# Patient Record
Sex: FEMALE | Race: WHITE | NOT HISPANIC OR LATINO | Employment: OTHER | URBAN - NONMETROPOLITAN AREA
[De-identification: names, ages, dates, MRNs, and addresses within clinical notes are randomized per-mention and may not be internally consistent; named-entity substitution may affect disease eponyms.]

---

## 2017-10-03 ENCOUNTER — TRANSCRIBE ORDERS (OUTPATIENT)
Dept: ADMINISTRATIVE | Facility: HOSPITAL | Age: 72
End: 2017-10-03

## 2017-10-03 DIAGNOSIS — Z12.31 ENCOUNTER FOR SCREENING MAMMOGRAM FOR MALIGNANT NEOPLASM OF BREAST: Primary | ICD-10-CM

## 2017-10-09 ENCOUNTER — HOSPITAL ENCOUNTER (OUTPATIENT)
Dept: MAMMOGRAPHY | Facility: HOSPITAL | Age: 72
Discharge: HOME | End: 2017-10-09
Attending: INTERNAL MEDICINE | Admitting: INTERNAL MEDICINE

## 2017-10-09 DIAGNOSIS — Z12.31 ENCOUNTER FOR SCREENING MAMMOGRAM FOR MALIGNANT NEOPLASM OF BREAST: ICD-10-CM

## 2017-10-09 PROCEDURE — G0202 SCR MAMMO BI INCL CAD: HCPCS

## 2017-10-09 PROCEDURE — 77063 BREAST TOMOSYNTHESIS BI: CPT

## 2018-08-28 ENCOUNTER — PROCEDURE VISIT (OUTPATIENT)
Dept: OTOLARYNGOLOGY | Facility: CLINIC | Age: 73
End: 2018-08-28

## 2018-08-28 ENCOUNTER — OFFICE VISIT (OUTPATIENT)
Dept: OTOLARYNGOLOGY | Facility: CLINIC | Age: 73
End: 2018-08-28

## 2018-08-28 VITALS
WEIGHT: 166.25 LBS | BODY MASS INDEX: 26.72 KG/M2 | TEMPERATURE: 97.6 F | HEIGHT: 66 IN | DIASTOLIC BLOOD PRESSURE: 90 MMHG | HEART RATE: 87 BPM | SYSTOLIC BLOOD PRESSURE: 150 MMHG

## 2018-08-28 DIAGNOSIS — H90.3 SENSORINEURAL HEARING LOSS (SNHL) OF BOTH EARS: Primary | ICD-10-CM

## 2018-08-28 DIAGNOSIS — H90.3 SENSORINEURAL HEARING LOSS (SNHL), BILATERAL: Primary | ICD-10-CM

## 2018-08-28 PROCEDURE — 99213 OFFICE O/P EST LOW 20 MIN: CPT | Performed by: NURSE PRACTITIONER

## 2018-08-28 PROCEDURE — 92550 TYMPANOMETRY & REFLEX THRESH: CPT | Performed by: AUDIOLOGIST

## 2018-08-28 RX ORDER — ATORVASTATIN CALCIUM 40 MG/1
TABLET, FILM COATED ORAL
COMMUNITY

## 2018-08-28 RX ORDER — NICOTINE POLACRILEX 2 MG
GUM BUCCAL
COMMUNITY

## 2018-08-28 NOTE — PROGRESS NOTES
CASE HISTORY DETAILS   Ms. Robertson presented to the clinic this date with complaints of possible right TM perforation.        SUMMARY   RIGHT  · Otoscopy revealed clear EAC/Unremarkable TM.  · Mild to severe sloping sensorineural hearing loss.  · Immitance measures are consistent with normal middle ear function.    LEFT  · Otoscopy revealed clear EAC/Unremarkable TM.  · Mild to severe sloping sensorineural hearing loss.  · Immitance measures are consistent with normal middle ear function.    RECOMMENDATIONS   Results of today's evaluation were discussed with Ms. Robertson and the following recommendations were made:  1. ENT evaluation today as scheduled.  2. HAE as desired.  3. Monitor hearing yearly.    AUDIOGRAM AND IMMITANCE       Gayla Hernández, CCC-A  Audiologist

## 2018-08-28 NOTE — PATIENT INSTRUCTIONS
Call for problems or worsening symptoms    Avoid loud noise exposure. Protect hearing in with extreme loud noise.   Recommend fan, sound machine, white noise from TV for tinnitus masking. Avoid excessive caffeine, decrease stress level, monitor BP regularly, routine sleep schedule. Low Sodium Diet.   Tinnitus Handout   Hearing loss handout   Appt with hearing aid specialist for hearing aid fitting when patient so desires.   Call for change or worsening symptoms not controlled by current treatment regimen.

## 2018-08-28 NOTE — PROGRESS NOTES
YOB: 1945  Location: [facility]  Location Address: [address] [place] [address], [facility], [address] [place] [address]  Location Phone: [phone]    Chief Complaint   Patient presents with   • Hearing Loss       History of Present Illness  Rosina Robertson is a 72 y.o. female.  Rosina Robertson is here for evaluation of ENT complaints. The patient has had problems with cerumen accumulation and decreased hearing  The symptoms are not localized to a particular location. The patient has had moderate symptoms. The symptoms have been present for the last several months The symptoms are aggravated by  no identifiable factors. The symptoms are improved by no identifiable factors.  Recently developed an ear pain/dysfunction after having her ear flushed.  She routinely has her ears flushed.       Procedure visit     2018  [facility]   Vicki Jean Baptiste AUD   Audiology   Sensorineural hearing loss (SNHL), bilateral   Dx   Hearing Loss   Reason for Visit    Progress Notes           CASE HISTORY DETAILS   Ms. Robertson presented to the clinic this date with complaints of possible right TM perforation.         SUMMARY   RIGHT  ? Otoscopy revealed clear EAC/Unremarkable TM.  ? Mild to severe sloping sensorineural hearing loss.  ? Immitance measures are consistent with normal middle ear function.     LEFT  ? Otoscopy revealed clear EAC/Unremarkable TM.  ? Mild to severe sloping sensorineural hearing loss.  ? Immitance measures are consistent with normal middle ear function.     RECOMMENDATIONS   Results of today's evaluation were discussed with Ms. Robertson and the following recommendations were made:  1. ENT evaluation today as scheduled.  2. HAE as desired.  3. Monitor hearing yearly.     AUDIOGRAM AND IMMITANCE        Gayla Hernández, [facility]-A  Audiologist              Past Medical History:   Diagnosis Date   • Eczema    • High cholesterol        Past Surgical History:   Procedure  Laterality Date   • HYSTERECTOMY      partial   • TONSILLECTOMY         Outpatient Prescriptions Marked as Taking for the 8/28/18 encounter (Office Visit) with Azul Parks APRN   Medication Sig Dispense Refill   • atorvastatin (LIPITOR) 40 MG tablet atorvastatin 40 mg tablet   TAKE 1 TABLET BY MOUTH  DAILY     • Biotin 1 MG capsule biotin   bid     • Calcium Carbonate (CALTRATE 600 PO) Caltrate + D3 Plus Minerals   600mg/800IU 1 tablet twice daily     • Multiple Vitamins-Minerals (MULTIPLE VITAMINS/WOMENS PO) Multiple Vitamin, Womens   1 po qd         Patient has no known allergies.    Family History   Problem Relation Age of Onset   • No Known Problems Mother    • No Known Problems Father        Social History     Social History   • Marital status:      Spouse name: N/A   • Number of children: N/A   • Years of education: N/A     Occupational History   • Not on file.     Social History Main Topics   • Smoking status: Former Smoker   • Smokeless tobacco: Never Used      Comment: quit 2002   • Alcohol use Yes      Comment: occasionally   • Drug use: Unknown   • Sexual activity: Not on file     Other Topics Concern   • Not on file     Social History Narrative   • No narrative on file       Review of Systems   Constitutional: Negative.    HENT:        SEE HPI   Eyes: Negative.    Respiratory: Negative.    Cardiovascular: Negative.    Gastrointestinal: Negative.    Endocrine: Negative.    Genitourinary: Negative.    Musculoskeletal: Negative.    Skin: Negative.    Allergic/Immunologic: Negative.    Neurological: Negative.    Hematological: Negative.    Psychiatric/Behavioral: Negative.        Vitals:    08/28/18 1439   BP: 150/90   Pulse: 87   Temp: 97.6 °F (36.4 °C)       Body mass index is 26.83 kg/m².    Objective     Physical Exam  CONSTITUTIONAL: well nourished, alert, oriented, in no acute distress     COMMUNICATION AND VOICE: able to communicate normally, normal voice quality    HEAD: normocephalic, no  lesions, atraumatic, no tenderness, no masses     FACE: appearance normal, no lesions, no tenderness, no deformities, facial motion symmetric    SALIVARY GLANDS: parotid glands with no tenderness, no swelling, no masses, submandibular glands with normal size, nontender    EYES: ocular motility normal, eyelids normal, orbits normal, no proptosis, conjunctiva normal , pupils equal, round     EARS:  Hearing: response to conversational voice normal bilaterally   External Ears: auricles without lesions  Otoscopic: tympanic membrane appearance normal, no lesions, no perforation, normal mobility, no fluid    NOSE:  External Nose: structure normal, no tenderness on palpation, no nasal discharge, no lesions, no evidence of trauma, nostrils patent   Intranasal Exam: nasal mucosa normal, vestibule within normal limits, inferior turbinate normal, nasal septum midline     ORAL:  Lips: upper and lower lips without lesion   Teeth: dentition within normal limits for age   Gums: gingivae healthy   Oral Mucosa: oral mucosa normal, no mucosal lesions   Floor of Mouth: Warthin’s duct patent, mucosa normal  Tongue: lingual mucosa normal without lesions, normal tongue mobility   Palate: soft and hard palates with normal mucosa and structure  Oropharynx: oropharyngeal mucosa normal    NECK: neck appearance normal, no mass,  noted without erythema or tenderness    LYMPH NODES: no lymphadenopathy    CHEST/RESPIRATORY: respiratory effort normal    CARDIOVASCULAR:  extremities without cyanosis or edema      NEUROLOGIC/PSYCHIATRIC: oriented to time, place and person, mood normal, affect appropriate, CN II-XII intact grossly    Assessment/Plan   Rosina was seen today for hearing loss.    Diagnoses and all orders for this visit:    Sensorineural hearing loss (SNHL) of both ears      * Surgery not found *  No orders of the defined types were placed in this encounter.    Return in about 6 months (around 2/28/2019).       Patient Instructions   Call  for problems or worsening symptoms    Avoid loud noise exposure. Protect hearing in with extreme loud noise.   Recommend fan, sound machine, white noise from TV for tinnitus masking. Avoid excessive caffeine, decrease stress level, monitor BP regularly, routine sleep schedule. Low Sodium Diet.   Tinnitus Handout   Hearing loss handout   Appt with hearing aid specialist for hearing aid fitting when patient so desires.   Call for change or worsening symptoms not controlled by current treatment regimen.

## 2018-10-01 ENCOUNTER — TRANSCRIBE ORDERS (OUTPATIENT)
Dept: ADMINISTRATIVE | Facility: HOSPITAL | Age: 73
End: 2018-10-01

## 2018-10-01 DIAGNOSIS — Z12.31 ENCOUNTER FOR SCREENING MAMMOGRAM FOR MALIGNANT NEOPLASM OF BREAST: ICD-10-CM

## 2018-10-01 DIAGNOSIS — Z78.0 ASYMPTOMATIC MENOPAUSAL STATE: Primary | ICD-10-CM

## 2018-10-11 ENCOUNTER — HOSPITAL ENCOUNTER (OUTPATIENT)
Dept: BONE DENSITY | Facility: HOSPITAL | Age: 73
Discharge: HOME | End: 2018-10-11

## 2018-10-11 ENCOUNTER — HOSPITAL ENCOUNTER (OUTPATIENT)
Dept: MAMMOGRAPHY | Facility: HOSPITAL | Age: 73
Discharge: HOME | End: 2018-10-11
Admitting: PHYSICIAN ASSISTANT

## 2018-10-11 DIAGNOSIS — Z12.31 ENCOUNTER FOR SCREENING MAMMOGRAM FOR MALIGNANT NEOPLASM OF BREAST: ICD-10-CM

## 2018-10-11 DIAGNOSIS — Z78.0 ASYMPTOMATIC MENOPAUSAL STATE: ICD-10-CM

## 2018-10-11 PROCEDURE — 77063 BREAST TOMOSYNTHESIS BI: CPT

## 2018-10-11 PROCEDURE — 77067 SCR MAMMO BI INCL CAD: CPT

## 2018-10-11 PROCEDURE — 77080 DXA BONE DENSITY AXIAL: CPT

## 2019-02-28 ENCOUNTER — OFFICE VISIT (OUTPATIENT)
Dept: OTOLARYNGOLOGY | Facility: CLINIC | Age: 74
End: 2019-02-28

## 2019-02-28 VITALS
HEART RATE: 80 BPM | BODY MASS INDEX: 26.26 KG/M2 | DIASTOLIC BLOOD PRESSURE: 84 MMHG | SYSTOLIC BLOOD PRESSURE: 144 MMHG | WEIGHT: 163.38 LBS | TEMPERATURE: 97.8 F | HEIGHT: 66 IN

## 2019-02-28 DIAGNOSIS — H61.23 BILATERAL IMPACTED CERUMEN: Primary | ICD-10-CM

## 2019-02-28 PROCEDURE — 69210 REMOVE IMPACTED EAR WAX UNI: CPT | Performed by: NURSE PRACTITIONER

## 2019-02-28 RX ORDER — CHLORAL HYDRATE 500 MG
CAPSULE ORAL
COMMUNITY

## 2019-07-31 ENCOUNTER — TRANSCRIBE ORDERS (OUTPATIENT)
Dept: ADMINISTRATIVE | Facility: HOSPITAL | Age: 74
End: 2019-07-31

## 2019-07-31 DIAGNOSIS — Z12.31 ENCOUNTER FOR SCREENING MAMMOGRAM FOR MALIGNANT NEOPLASM OF BREAST: Primary | ICD-10-CM

## 2019-08-30 ENCOUNTER — OFFICE VISIT (OUTPATIENT)
Dept: OTOLARYNGOLOGY | Facility: CLINIC | Age: 74
End: 2019-08-30

## 2019-08-30 VITALS
TEMPERATURE: 98.7 F | WEIGHT: 156 LBS | BODY MASS INDEX: 25.07 KG/M2 | SYSTOLIC BLOOD PRESSURE: 128 MMHG | RESPIRATION RATE: 17 BRPM | OXYGEN SATURATION: 100 % | HEIGHT: 66 IN | DIASTOLIC BLOOD PRESSURE: 72 MMHG | HEART RATE: 75 BPM

## 2019-08-30 DIAGNOSIS — H90.3 SENSORINEURAL HEARING LOSS (SNHL) OF BOTH EARS: Primary | ICD-10-CM

## 2019-08-30 PROCEDURE — 99213 OFFICE O/P EST LOW 20 MIN: CPT | Performed by: NURSE PRACTITIONER

## 2019-10-15 ENCOUNTER — HOSPITAL ENCOUNTER (OUTPATIENT)
Dept: MAMMOGRAPHY | Facility: HOSPITAL | Age: 74
Discharge: HOME | End: 2019-10-15
Admitting: INTERNAL MEDICINE

## 2019-10-15 DIAGNOSIS — Z12.31 ENCOUNTER FOR SCREENING MAMMOGRAM FOR MALIGNANT NEOPLASM OF BREAST: ICD-10-CM

## 2019-10-15 PROCEDURE — 77063 BREAST TOMOSYNTHESIS BI: CPT

## 2019-10-15 PROCEDURE — 77067 SCR MAMMO BI INCL CAD: CPT

## 2020-02-28 ENCOUNTER — OFFICE VISIT (OUTPATIENT)
Dept: OTOLARYNGOLOGY | Facility: CLINIC | Age: 75
End: 2020-02-28

## 2020-02-28 VITALS
HEART RATE: 83 BPM | BODY MASS INDEX: 25.04 KG/M2 | WEIGHT: 155.8 LBS | HEIGHT: 66 IN | TEMPERATURE: 97.4 F | SYSTOLIC BLOOD PRESSURE: 150 MMHG | DIASTOLIC BLOOD PRESSURE: 81 MMHG

## 2020-02-28 DIAGNOSIS — H90.3 SENSORINEURAL HEARING LOSS (SNHL) OF BOTH EARS: Primary | ICD-10-CM

## 2020-02-28 PROCEDURE — 99213 OFFICE O/P EST LOW 20 MIN: CPT | Performed by: NURSE PRACTITIONER

## 2020-03-22 NOTE — PROGRESS NOTES
PRIMARY CARE PROVIDER: David Rolle MD  REFERRING PROVIDER: No ref. provider found    Chief Complaint   Patient presents with   • Follow-up       Subjective   History of Present Illness:  Rosina Robertson is a 74 y.o. female who is here for follow up. She has had problems with cerumen accumulation and decreased hearing. The symptoms are localized to both ears. The symptoms severity was described as: mild to moderate and stable. The symptoms have been: present for the last several years. There have been no identified factors that aggravate the symptoms. The symptoms are improved by routine ear cleaning.  She denies otalgia, otorrhea, dizziness, vertigo, or change in hearing.      Review of Systems:  Review of Systems   Constitutional: Negative for chills and fever.   HENT: Positive for hearing loss. Negative for ear discharge, ear pain, sinus pressure, sinus pain, trouble swallowing and voice change.    Respiratory: Negative for cough and shortness of breath.    Cardiovascular: Negative for chest pain.   Gastrointestinal: Negative for diarrhea, nausea and vomiting.         Past History:  Past Medical History:   Diagnosis Date   • Eczema    • High cholesterol    • Sensorineural hearing loss      Past Surgical History:   Procedure Laterality Date   • HYSTERECTOMY      partial   • TONSILLECTOMY       Family History   Problem Relation Age of Onset   • No Known Problems Mother    • No Known Problems Father    • Breast cancer Neg Hx      Social History     Tobacco Use   • Smoking status: Former Smoker     Packs/day: 1.00     Years: 30.00     Pack years: 30.00     Types: Cigarettes     Last attempt to quit: 2005     Years since quitting: 15.2   • Smokeless tobacco: Never Used   Substance Use Topics   • Alcohol use: Yes     Comment: occasionally   • Drug use: Never     Allergies:  Patient has no known allergies.    Current Outpatient Medications:   •  atorvastatin (LIPITOR) 40 MG tablet, atorvastatin 40 mg tablet   "TAKE 1 TABLET BY MOUTH  DAILY, Disp: , Rfl:   •  Biotin 1 MG capsule, biotin  bid, Disp: , Rfl:   •  Calcium Carbonate (CALTRATE 600) 1500 (600 Ca) MG tablet, Caltrate + D3 Plus Minerals  600mg/400IU 1 tablet twice daily, Disp: , Rfl:   •  Cholecalciferol (VITAMIN D HIGH POTENCY PO), Take  by mouth., Disp: , Rfl:   •  Multiple Vitamins-Minerals (MULTIPLE VITAMINS/WOMENS PO), Multiple Vitamin, Womens  1 po qd, Disp: , Rfl:   •  Omega-3 Fatty Acids (FISH OIL) 1000 MG capsule capsule, Take  by mouth Daily With Breakfast., Disp: , Rfl:       Objective     Vital Signs:    /81   Pulse 83   Temp 97.4 °F (36.3 °C) (Temporal)   Ht 167.6 cm (66\")   Wt 70.7 kg (155 lb 12.8 oz)   BMI 25.15 kg/m²     Physical Exam:  Physical Exam  CONSTITUTIONAL: well nourished, well-developed, alert, oriented, in no acute distress   COMMUNICATION AND VOICE: able to communicate normally, normal voice quality  HEAD: normocephalic, no lesions, atraumatic, no tenderness, no masses   FACE: appearance normal, no lesions, no tenderness, no deformities, facial motion symmetric  SALIVARY GLANDS: parotid glands with no tenderness, no swelling, no masses, submandibular glands with normal size, nontender  EYES: ocular motility normal, eyelids normal, orbits normal, no proptosis, conjunctiva normal , pupils equal, round   EARS:  Hearing: response to conversational voice normal bilaterally   External Ears: auricles without lesions  Otoscopic: tympanic membrane appearance normal, no lesions, no perforation, normal mobility, no fluid, no cerumen present in the bilateral external auditory canal  NOSE:  External Nose: structure normal, no tenderness on palpation, no nasal discharge, no lesions, no evidence of trauma, nostrils patent   ORAL:  Lips: upper and lower lips without lesion   NECK: neck appearance normal, no masses or tenderness  LYMPH NODES: no lymphadenopathy  CHEST/RESPIRATORY: respiratory effort normal, normal breath sounds "   CARDIOVASCULAR: rate and rhythm normal, extremities without cyanosis or edema    NEUROLOGIC/PSYCHIATRIC: oriented to time, place and person, mood normal, affect appropriate, CN II-XII intact grossly      Assessment   Assessment:  1. Sensorineural hearing loss (SNHL) of both ears        Plan   Plan:    Use hearing protection in loud noise situations.  Consider hearing aid amplification.  Obtain a yearly audiogram to follow hearing.  Call for ear drainage, ear pain, fever over 101, or hearing loss. Call for problems or worsening symptoms.       Return in about 1 year (around 2/28/2021) for Recheck, With Audio.    My findings and recommendations were discussed and questions were answered.     Margie Gloria, APRN

## 2020-08-06 ENCOUNTER — TRANSCRIBE ORDERS (OUTPATIENT)
Dept: ADMINISTRATIVE | Facility: HOSPITAL | Age: 75
End: 2020-08-06

## 2020-08-06 DIAGNOSIS — Z78.0 ASYMPTOMATIC MENOPAUSAL STATE: Primary | ICD-10-CM

## 2020-08-06 DIAGNOSIS — Z12.31 ENCOUNTER FOR SCREENING MAMMOGRAM FOR MALIGNANT NEOPLASM OF BREAST: ICD-10-CM

## 2020-10-19 ENCOUNTER — HOSPITAL ENCOUNTER (OUTPATIENT)
Dept: MAMMOGRAPHY | Facility: HOSPITAL | Age: 75
Discharge: HOME | End: 2020-10-19

## 2020-10-19 ENCOUNTER — HOSPITAL ENCOUNTER (OUTPATIENT)
Dept: BONE DENSITY | Facility: HOSPITAL | Age: 75
Discharge: HOME | End: 2020-10-19

## 2020-10-19 DIAGNOSIS — Z78.0 ASYMPTOMATIC MENOPAUSAL STATE: ICD-10-CM

## 2020-10-19 DIAGNOSIS — Z12.31 ENCOUNTER FOR SCREENING MAMMOGRAM FOR MALIGNANT NEOPLASM OF BREAST: ICD-10-CM

## 2020-10-19 PROCEDURE — 77067 SCR MAMMO BI INCL CAD: CPT

## 2020-10-19 PROCEDURE — 77080 DXA BONE DENSITY AXIAL: CPT

## 2020-10-19 PROCEDURE — 77063 BREAST TOMOSYNTHESIS BI: CPT

## 2021-02-05 ENCOUNTER — IMMUNIZATION (OUTPATIENT)
Age: 76
End: 2021-02-05
Payer: MEDICARE

## 2021-02-05 PROCEDURE — 0001A COVID-19, PFIZER VACCINE 30MCG/0.3ML DOSE: CPT | Performed by: FAMILY MEDICINE

## 2021-02-05 PROCEDURE — 91300 COVID-19, PFIZER VACCINE 30MCG/0.3ML DOSE: CPT | Performed by: FAMILY MEDICINE

## 2021-02-26 ENCOUNTER — IMMUNIZATION (OUTPATIENT)
Age: 76
End: 2021-02-26
Payer: MEDICARE

## 2021-02-26 PROCEDURE — 0002A PR IMM ADMN SARSCOV2 30MCG/0.3ML DIL RECON 2ND DOSE: CPT | Performed by: FAMILY MEDICINE

## 2021-02-26 PROCEDURE — 91300 COVID-19, PFIZER VACCINE 30MCG/0.3ML DOSE: CPT | Performed by: FAMILY MEDICINE

## 2021-03-01 ENCOUNTER — OFFICE VISIT (OUTPATIENT)
Dept: OTOLARYNGOLOGY | Facility: CLINIC | Age: 76
End: 2021-03-01

## 2021-03-01 ENCOUNTER — PROCEDURE VISIT (OUTPATIENT)
Dept: OTOLARYNGOLOGY | Facility: CLINIC | Age: 76
End: 2021-03-01

## 2021-03-01 VITALS — DIASTOLIC BLOOD PRESSURE: 74 MMHG | HEART RATE: 77 BPM | SYSTOLIC BLOOD PRESSURE: 137 MMHG | TEMPERATURE: 97.4 F

## 2021-03-01 DIAGNOSIS — H90.3 SENSORINEURAL HEARING LOSS (SNHL) OF BOTH EARS: Primary | ICD-10-CM

## 2021-03-01 DIAGNOSIS — H61.23 BILATERAL IMPACTED CERUMEN: ICD-10-CM

## 2021-03-01 PROCEDURE — 92557 COMPREHENSIVE HEARING TEST: CPT | Performed by: NURSE PRACTITIONER

## 2021-03-01 PROCEDURE — 92567 TYMPANOMETRY: CPT | Performed by: NURSE PRACTITIONER

## 2021-03-01 PROCEDURE — 69210 REMOVE IMPACTED EAR WAX UNI: CPT | Performed by: NURSE PRACTITIONER

## 2021-03-01 PROCEDURE — 99213 OFFICE O/P EST LOW 20 MIN: CPT | Performed by: NURSE PRACTITIONER

## 2021-03-01 NOTE — PROGRESS NOTES
PRIMARY CARE PROVIDER: David Rolle MD  REFERRING PROVIDER: No ref. provider found    Chief Complaint   Patient presents with   • Follow-up     1 year follow up       Subjective   History of Present Illness:  Rosina Robertson is a 75 y.o. female who is here for follow up. She has had problems with cerumen accumulation and decreased hearing. The symptoms are localized to both ears. The symptoms severity was described as: mild to moderate and worsening. The symptoms have been: present for the last several years. There have been no identified factors that aggravate the symptoms. The symptoms are improved by routine ear cleaning.  She denies otalgia, otorrhea, dizziness, vertigo.      Review of Systems:  Review of Systems   Constitutional: Negative for chills and fever.   HENT: Positive for hearing loss. Negative for ear discharge, ear pain, sinus pressure, sinus pain, trouble swallowing and voice change.    Respiratory: Negative for cough and shortness of breath.    Cardiovascular: Negative for chest pain.   Gastrointestinal: Negative for diarrhea, nausea and vomiting.       Past History:  Past Medical History:   Diagnosis Date   • Eczema    • High cholesterol    • Sensorineural hearing loss      Past Surgical History:   Procedure Laterality Date   • HYSTERECTOMY      partial   • TONSILLECTOMY       Family History   Problem Relation Age of Onset   • No Known Problems Mother    • No Known Problems Father    • Breast cancer Neg Hx      Social History     Tobacco Use   • Smoking status: Former Smoker     Packs/day: 1.00     Years: 30.00     Pack years: 30.00     Types: Cigarettes     Quit date:      Years since quittin.1   • Smokeless tobacco: Never Used   Substance Use Topics   • Alcohol use: Yes     Comment: occasionally   • Drug use: Never     Allergies:  Patient has no known allergies.    Current Outpatient Medications:   •  atorvastatin (LIPITOR) 40 MG tablet, atorvastatin 40 mg tablet  TAKE 1  TABLET BY MOUTH  DAILY, Disp: , Rfl:   •  Biotin 1 MG capsule, biotin  bid, Disp: , Rfl:   •  Calcium Carbonate (CALTRATE 600) 1500 (600 Ca) MG tablet, Caltrate + D3 Plus Minerals  600mg/400IU 1 tablet twice daily, Disp: , Rfl:   •  Cholecalciferol (VITAMIN D HIGH POTENCY PO), Take  by mouth., Disp: , Rfl:   •  Multiple Vitamins-Minerals (MULTIPLE VITAMINS/WOMENS PO), Multiple Vitamin, Womens  1 po qd, Disp: , Rfl:   •  Omega-3 Fatty Acids (FISH OIL) 1000 MG capsule capsule, Take  by mouth Daily With Breakfast., Disp: , Rfl:       Objective     Vital Signs:  Temp:  [97.4 °F (36.3 °C)] 97.4 °F (36.3 °C)  Heart Rate:  [77] 77  BP: (137)/(74) 137/74 /74   Pulse 77   Temp 97.4 °F (36.3 °C) (Temporal)     Physical Exam:  Physical Exam  CONSTITUTIONAL: well nourished, well-developed, alert, oriented, in no acute distress   COMMUNICATION AND VOICE: able to communicate normally, normal voice quality  HEAD: normocephalic, no lesions, atraumatic, no tenderness, no masses   FACE: appearance normal, no lesions, no tenderness, no deformities, facial motion symmetric  SALIVARY GLANDS: parotid glands with no tenderness, no swelling, no masses, submandibular glands with normal size, nontender  EYES: ocular motility normal, eyelids normal, orbits normal, no proptosis, conjunctiva normal , pupils equal, round   EARS:  Hearing: response to conversational voice normal   External Ears: auricles without lesions  Otoscopic: tympanic membrane appearance normal, no lesions, no perforation, normal mobility, no fluid, bilateral cerumen impactions removed under microscopy with suction  NOSE:  External Nose: structure normal, no tenderness on palpation, no nasal discharge, no lesions, no evidence of trauma, nostrils patent   ORAL:  Lips: upper and lower lips without lesion   NECK: neck appearance normal, no masses or tenderness  LYMPH NODES: no lymphadenopathy  CHEST/RESPIRATORY: respiratory effort normal, normal breath sounds    CARDIOVASCULAR: rate and rhythm normal, extremities without cyanosis or edema    NEUROLOGIC/PSYCHIATRIC: oriented to time, place and person, mood normal, affect appropriate, CN II-XII intact grossly      PROCEDURE NOTE    LOCATION: [facility]  PROVIDER: LUCIO George   PREOPERATIVE DIAGNOSIS: Cerumen Impaction bilaterally   POSTOPERATIVE DIAGNOSIS: Same  PROCEDURE: Cerumen removal  ANESTHESIA: None   REASON FOR THE OPERATION: The patient verbally consented to intervention after a full discussion of risks, benefits, and alternatives. No guarantees were made or implied.   DETAILS OF THE OPERATION: The patient was reclined in the procedure room chair. The binocular microscope was used to visualize the ear canal and tympanic membrane. Cerumen was then removed from the both ears using a suction. Findings: Bilateral EACs clear without lesion.  Bilateral tympanic membranes intact without effusion. Patient tolerated procedure well and was without complications      Results reviewed:        Assessment   Assessment:  1. Sensorineural hearing loss (SNHL) of both ears    2. Bilateral impacted cerumen        Plan   Plan:    Use hearing protection in loud noise situations.  Consider hearing aid amplification.  Obtain a yearly audiogram to follow hearing.  Call for ear drainage, ear pain, fever over 101, or hearing loss. Call for problems or worsening symptoms.     Time Spent: I spent 21 minutes caring for Rosina on this date of service. This time includes time spent by me in the following activities: preparing for the visit, reviewing tests, obtaining and/or reviewing a separately obtained history, performing a medically appropriate examination and/or evaluation, counseling and educating the patient/family/caregiver, documenting information in the medical record and independently interpreting results and communicating that information with the patient/family/caregiver.     Return in about 1 year (around 3/1/2022) for  Recheck.    My findings and recommendations were discussed and questions were answered.     Margie Gloria, APRN

## 2021-08-09 ENCOUNTER — TRANSCRIBE ORDERS (OUTPATIENT)
Dept: ADMINISTRATIVE | Facility: HOSPITAL | Age: 76
End: 2021-08-09

## 2021-08-09 DIAGNOSIS — Z12.31 ENCOUNTER FOR SCREENING MAMMOGRAM FOR MALIGNANT NEOPLASM OF BREAST: Primary | ICD-10-CM

## 2021-10-20 ENCOUNTER — HOSPITAL ENCOUNTER (OUTPATIENT)
Dept: MAMMOGRAPHY | Facility: HOSPITAL | Age: 76
Discharge: HOME | End: 2021-10-20
Admitting: INTERNAL MEDICINE

## 2021-10-20 DIAGNOSIS — Z12.31 ENCOUNTER FOR SCREENING MAMMOGRAM FOR MALIGNANT NEOPLASM OF BREAST: ICD-10-CM

## 2021-10-20 PROCEDURE — 77063 BREAST TOMOSYNTHESIS BI: CPT

## 2021-10-20 PROCEDURE — 77067 SCR MAMMO BI INCL CAD: CPT

## 2021-12-08 ENCOUNTER — OFFICE VISIT (OUTPATIENT)
Dept: OTOLARYNGOLOGY | Facility: CLINIC | Age: 76
End: 2021-12-08

## 2021-12-08 VITALS — HEART RATE: 86 BPM | TEMPERATURE: 97.2 F | DIASTOLIC BLOOD PRESSURE: 79 MMHG | SYSTOLIC BLOOD PRESSURE: 121 MMHG

## 2021-12-08 DIAGNOSIS — J31.0 CHRONIC RHINITIS: ICD-10-CM

## 2021-12-08 DIAGNOSIS — Z86.69 HISTORY OF IMPACTED CERUMEN: ICD-10-CM

## 2021-12-08 DIAGNOSIS — H90.3 SENSORINEURAL HEARING LOSS (SNHL) OF BOTH EARS: Primary | ICD-10-CM

## 2021-12-08 PROCEDURE — 99214 OFFICE O/P EST MOD 30 MIN: CPT | Performed by: NURSE PRACTITIONER

## 2021-12-08 RX ORDER — FLUTICASONE PROPIONATE 50 MCG
2 SPRAY, SUSPENSION (ML) NASAL DAILY
Qty: 16 G | Refills: 11 | Status: SHIPPED | OUTPATIENT
Start: 2021-12-08 | End: 2022-01-07

## 2021-12-08 NOTE — PROGRESS NOTES
PRIMARY CARE PROVIDER: David Rolle MD  REFERRING PROVIDER: No ref. provider found    Chief Complaint   Patient presents with   • Follow-up       Subjective   History of Present Illness:  Rosina Robertson is a 76 y.o. female who is here for follow up. She has had problems with cerumen accumulation and decreased hearing. The symptoms are localized to both ears. The symptoms severity was described as: mild to moderate and worsening. The symptoms have been: present for the last several years. There have been no identified factors that aggravate the symptoms. The symptoms are improved by routine ear cleaning.  She also reports a recent flareup of right sided otalgia and ear pressure.  She describes this as aching.  It was intermittent and began over 1 week ago.  It has since resolved on its own.  She also complains of allergy symptoms including nasal congestion and nasal drainage.  Today, she denies otalgia, otorrhea, dizziness, vertigo.      Past History:  Past Medical History:   Diagnosis Date   • Eczema    • High cholesterol    • Sensorineural hearing loss      Past Surgical History:   Procedure Laterality Date   • HYSTERECTOMY      partial   • TONSILLECTOMY       Family History   Problem Relation Age of Onset   • No Known Problems Mother    • No Known Problems Father    • Breast cancer Neg Hx      Social History     Tobacco Use   • Smoking status: Former Smoker     Packs/day: 1.00     Years: 30.00     Pack years: 30.00     Types: Cigarettes     Quit date:      Years since quittin.9   • Smokeless tobacco: Never Used   Substance Use Topics   • Alcohol use: Yes     Comment: occasionally   • Drug use: Never     Allergies:  Patient has no known allergies.    Current Outpatient Medications:   •  atorvastatin (LIPITOR) 40 MG tablet, atorvastatin 40 mg tablet  TAKE 1 TABLET BY MOUTH  DAILY, Disp: , Rfl:   •  Biotin 1 MG capsule, biotin  bid, Disp: , Rfl:   •  Calcium Carbonate (CALTRATE 600) 1500 (600 Ca)  MG tablet, Caltrate + D3 Plus Minerals  600mg/400IU 1 tablet twice daily, Disp: , Rfl:   •  Cholecalciferol (VITAMIN D HIGH POTENCY PO), Take  by mouth., Disp: , Rfl:   •  Multiple Vitamins-Minerals (MULTIPLE VITAMINS/WOMENS PO), Multiple Vitamin, Womens  1 po qd, Disp: , Rfl:   •  Omega-3 Fatty Acids (FISH OIL) 1000 MG capsule capsule, Take  by mouth Daily With Breakfast., Disp: , Rfl:   •  fluticasone (FLONASE) 50 MCG/ACT nasal spray, 2 sprays into the nostril(s) as directed by provider Daily for 30 days., Disp: 16 g, Rfl: 11      Objective     Vital Signs:    /79   Pulse 86   Temp 97.2 °F (36.2 °C) (Temporal)     Physical Exam:  Physical Exam  CONSTITUTIONAL: well nourished, well-developed, alert, oriented, in no acute distress   COMMUNICATION AND VOICE: able to communicate normally, normal voice quality  EARS:  Hearing: response to conversational voice normal   External Ears: auricles without lesions  Otoscopic: tympanic membrane appearance normal, no lesions, no perforation, normal mobility, no fluid  NOSE:  External Nose: structure normal, no tenderness on palpation, no nasal discharge, no lesions, no evidence of trauma, nostrils patent   ORAL:  Lips: upper and lower lips without lesion   NEUROLOGIC/PSYCHIATRIC: oriented to time, place and person, mood normal, affect appropriate, CN II-XII intact grossly      Results reviewed:        Assessment   Assessment:  1. Sensorineural hearing loss (SNHL) of both ears    2. History of impacted cerumen    3. Chronic rhinitis        Plan   Plan:  The otalgia has resolved on its own.    There is no cerumen present.  Use hearing protection in loud noise situations.  Consider hearing aid amplification.  Obtain a yearly audiogram to follow hearing.  Start Flonase.  She was instructed to call or return should any problems arise prior to next office visit.    New Medications Ordered This Visit   Medications   • fluticasone (FLONASE) 50 MCG/ACT nasal spray     Si  sprays into the nostril(s) as directed by provider Daily for 30 days.     Dispense:  16 g     Refill:  11       Return if symptoms worsen or fail to improve, for Next scheduled follow up.    My findings and recommendations were discussed and questions were answered.     Margie Gloria, APRN

## 2022-01-06 ENCOUNTER — TELEPHONE (OUTPATIENT)
Dept: OTOLARYNGOLOGY | Facility: CLINIC | Age: 77
End: 2022-01-06

## 2022-01-06 NOTE — TELEPHONE ENCOUNTER
Caller: Rosina Robertson    Relationship: Self    Best call back number: [phone]    What form or medical record are you requesting: REFERRAL    Who is requesting this form or medical record from you: DR CHADWICK STOCK    How would you like to receive the form or medical records (pick-up, mail, fax):   FAX NUMBER NOT PROVIDED BY PT      Timeframe paperwork needed: PRIOR TO APPT 1/19/22    Additional notes: PT CALLED TO REQUEST REFERRAL BE SENT TO AUDIOLOGY OFFICE OF DR CHADWICK STOCK BEFORE HER APPT ON 1/19/22. SHE DID NOT HAVE A FAX NUMBER FOR THEIR OFFICE. SHE SPOKE WITH VASHTI @ [phone]    IF NEEDED, PT CAN BE REACHED AT ANYTIME; OKAY TO LEAVE MESSAGE IF NO ANSWER.

## 2022-03-02 ENCOUNTER — OFFICE VISIT (OUTPATIENT)
Dept: OTOLARYNGOLOGY | Facility: CLINIC | Age: 77
End: 2022-03-02

## 2022-03-02 VITALS — DIASTOLIC BLOOD PRESSURE: 87 MMHG | SYSTOLIC BLOOD PRESSURE: 150 MMHG | HEART RATE: 76 BPM | TEMPERATURE: 97.6 F

## 2022-03-02 DIAGNOSIS — H90.3 SENSORINEURAL HEARING LOSS (SNHL) OF BOTH EARS: Primary | ICD-10-CM

## 2022-03-02 DIAGNOSIS — Z86.69 HISTORY OF IMPACTED CERUMEN: ICD-10-CM

## 2022-03-02 PROCEDURE — 99213 OFFICE O/P EST LOW 20 MIN: CPT | Performed by: NURSE PRACTITIONER

## 2022-03-02 NOTE — PROGRESS NOTES
PRIMARY CARE PROVIDER: David Rolle MD  REFERRING PROVIDER: No ref. provider found    Chief Complaint   Patient presents with   • Follow-up     1 year follow up       Subjective   History of Present Illness:  Rosina Robertson is a 76 y.o. female who is here for follow up. She has had problems with cerumen accumulation and decreased hearing. The symptoms are localized to both ears. The symptoms severity was described as: mild to moderate and stable. The symptoms have been: present for the last several years. There have been no identified factors that aggravate the symptoms. The symptoms are improved by routine ear cleaning and hearing aids.  She reports she recently purchased hearing aids from Dr. Harrison. Today, she denies otalgia, otorrhea, dizziness, vertigo, or recent change in hearing.      Past History:  Past Medical History:   Diagnosis Date   • Eczema    • High cholesterol    • Sensorineural hearing loss      Past Surgical History:   Procedure Laterality Date   • HYSTERECTOMY      partial   • TONSILLECTOMY       Family History   Problem Relation Age of Onset   • No Known Problems Mother    • No Known Problems Father    • Breast cancer Neg Hx      Social History     Tobacco Use   • Smoking status: Former Smoker     Packs/day: 1.00     Years: 30.00     Pack years: 30.00     Types: Cigarettes     Quit date:      Years since quittin.1   • Smokeless tobacco: Never Used   Substance Use Topics   • Alcohol use: Yes     Comment: occasionally   • Drug use: Never     Allergies:  Patient has no known allergies.    Current Outpatient Medications:   •  atorvastatin (LIPITOR) 40 MG tablet, atorvastatin 40 mg tablet  TAKE 1 TABLET BY MOUTH  DAILY, Disp: , Rfl:   •  Biotin 1 MG capsule, biotin  bid, Disp: , Rfl:   •  Calcium Carbonate (CALTRATE 600) 1500 (600 Ca) MG tablet, Caltrate + D3 Plus Minerals  600mg/400IU 1 tablet twice daily, Disp: , Rfl:   •  Cholecalciferol (VITAMIN D HIGH POTENCY PO), Take  by  mouth., Disp: , Rfl:   •  Multiple Vitamins-Minerals (MULTIPLE VITAMINS/WOMENS PO), Multiple Vitamin, Womens  1 po qd, Disp: , Rfl:   •  Omega-3 Fatty Acids (FISH OIL) 1000 MG capsule capsule, Take  by mouth Daily With Breakfast., Disp: , Rfl:   •  fluticasone (FLONASE) 50 MCG/ACT nasal spray, 2 sprays into the nostril(s) as directed by provider Daily for 30 days., Disp: 16 g, Rfl: 11      Objective     Vital Signs:  Temp:  [97.6 °F (36.4 °C)] 97.6 °F (36.4 °C)  Heart Rate:  [76] 76  BP: (150)/(87) 150/87 /87   Pulse 76   Temp 97.6 °F (36.4 °C) (Temporal)     Physical Exam:  Physical Exam  CONSTITUTIONAL: well nourished, well-developed, alert, oriented, in no acute distress   COMMUNICATION AND VOICE: able to communicate normally, normal voice quality  EARS:  Hearing: response to conversational voice normal   External Ears: auricles without lesions  Otoscopic: tympanic membrane appearance normal, no lesions, no perforation, normal mobility, no fluid, no cerumen accumulation  NOSE:  External Nose: structure normal, no tenderness on palpation, no nasal discharge, no lesions, no evidence of trauma, nostrils patent   ORAL:  Lips: upper and lower lips without lesion   NEUROLOGIC/PSYCHIATRIC: oriented to time, place and person, mood normal, affect appropriate, CN II-XII intact grossly      Results reviewed:        Assessment   Assessment:  1. Sensorineural hearing loss (SNHL) of both ears    2. History of impacted cerumen        Plan   Plan:    There is no cerumen present.  Use hearing protection in loud noise situations.  Continue hearing aid amplification.  Obtain a yearly audiogram to follow hearing.  She was instructed to call or return should any problems arise prior to next office visit.    Time Spent: I spent 20 minutes caring for Rosina on this date of service. This time includes time spent by me in the following activities: preparing for the visit, reviewing tests, obtaining and/or reviewing a separately  obtained history, performing a medically appropriate examination and/or evaluation, counseling and educating the patient/family/caregiver and documenting information in the medical record.     No orders of the defined types were placed in this encounter.      Return in about 1 year (around 3/2/2023), or if symptoms worsen or fail to improve, for Recheck.    My findings and recommendations were discussed and questions were answered.     Margie Gloria, APRN

## 2022-09-22 ENCOUNTER — TRANSCRIBE ORDERS (OUTPATIENT)
Dept: ADMINISTRATIVE | Facility: HOSPITAL | Age: 77
End: 2022-09-22

## 2022-09-22 DIAGNOSIS — Z78.0 POSTMENOPAUSAL STATUS: ICD-10-CM

## 2022-09-22 DIAGNOSIS — Z12.31 ENCOUNTER FOR SCREENING MAMMOGRAM FOR MALIGNANT NEOPLASM OF BREAST: Primary | ICD-10-CM

## 2022-10-25 ENCOUNTER — HOSPITAL ENCOUNTER (OUTPATIENT)
Dept: MAMMOGRAPHY | Facility: HOSPITAL | Age: 77
Discharge: HOME | End: 2022-10-25

## 2022-10-25 ENCOUNTER — HOSPITAL ENCOUNTER (OUTPATIENT)
Dept: BONE DENSITY | Facility: HOSPITAL | Age: 77
Discharge: HOME | End: 2022-10-25

## 2022-10-25 DIAGNOSIS — Z78.0 POSTMENOPAUSAL STATUS: ICD-10-CM

## 2022-10-25 DIAGNOSIS — Z12.31 ENCOUNTER FOR SCREENING MAMMOGRAM FOR MALIGNANT NEOPLASM OF BREAST: ICD-10-CM

## 2022-10-25 PROCEDURE — 77063 BREAST TOMOSYNTHESIS BI: CPT

## 2022-10-25 PROCEDURE — 77080 DXA BONE DENSITY AXIAL: CPT

## 2022-10-25 PROCEDURE — 77067 SCR MAMMO BI INCL CAD: CPT

## 2023-01-18 ENCOUNTER — OFFICE VISIT (OUTPATIENT)
Dept: OTOLARYNGOLOGY | Facility: CLINIC | Age: 78
End: 2023-01-18
Payer: MEDICARE

## 2023-01-18 VITALS — TEMPERATURE: 97.2 F | HEART RATE: 68 BPM | SYSTOLIC BLOOD PRESSURE: 160 MMHG | DIASTOLIC BLOOD PRESSURE: 85 MMHG

## 2023-01-18 DIAGNOSIS — H90.3 SENSORINEURAL HEARING LOSS (SNHL) OF BOTH EARS: Primary | ICD-10-CM

## 2023-01-18 DIAGNOSIS — H61.21 IMPACTED CERUMEN OF RIGHT EAR: ICD-10-CM

## 2023-01-18 PROCEDURE — 99213 OFFICE O/P EST LOW 20 MIN: CPT | Performed by: NURSE PRACTITIONER

## 2023-01-18 PROCEDURE — 69210 REMOVE IMPACTED EAR WAX UNI: CPT | Performed by: NURSE PRACTITIONER

## 2023-01-18 NOTE — PROGRESS NOTES
PRIMARY CARE PROVIDER: David Rolle MD  REFERRING PROVIDER: No ref. provider found    Chief Complaint   Patient presents with   • Hearing Loss     Cant hear out of right ear as well    • Cerumen Impaction       Subjective   History of Present Illness:  Rosina Robertson is a 77 y.o. female who is here for follow up. She has had problems with cerumen accumulation and decreased hearing.  Her symptoms are localized to the right ear.  Her symptoms have been chronically occurring with acute flareups.  Most recently, her symptoms have been gradual in onset over the last few months.  She feels this is likely due to cerumen accumulation.  She wears hearing aids from Dr. Harrison. Today, she denies otalgia, otorrhea, dizziness, vertigo, or recent change in hearing.    Past History:  Past Medical History:   Diagnosis Date   • Eczema    • High cholesterol    • Sensorineural hearing loss      Past Surgical History:   Procedure Laterality Date   • HYSTERECTOMY      partial   • TONSILLECTOMY       Family History   Problem Relation Age of Onset   • No Known Problems Mother    • No Known Problems Father    • Breast cancer Neg Hx      Social History     Tobacco Use   • Smoking status: Former     Packs/day: 1.00     Years: 30.00     Pack years: 30.00     Types: Cigarettes     Quit date:      Years since quittin.0   • Smokeless tobacco: Never   Substance Use Topics   • Alcohol use: Yes     Comment: occasionally   • Drug use: Never     Allergies:  Patient has no known allergies.    Current Outpatient Medications:   •  atorvastatin (LIPITOR) 40 MG tablet, atorvastatin 40 mg tablet  TAKE 1 TABLET BY MOUTH  DAILY, Disp: , Rfl:   •  Biotin 1 MG capsule, biotin  bid, Disp: , Rfl:   •  Calcium Carbonate 1500 (600 Ca) MG tablet, Caltrate + D3 Plus Minerals  600mg/400IU 1 tablet twice daily, Disp: , Rfl:   •  Cholecalciferol (VITAMIN D HIGH POTENCY PO), Take  by mouth., Disp: , Rfl:   •  Multiple Vitamins-Minerals (MULTIPLE  VITAMINS/WOMENS PO), Multiple Vitamin, Womens  1 po qd, Disp: , Rfl:   •  Omega-3 Fatty Acids (FISH OIL) 1000 MG capsule capsule, Take  by mouth Daily With Breakfast., Disp: , Rfl:   •  fluticasone (FLONASE) 50 MCG/ACT nasal spray, 2 sprays into the nostril(s) as directed by provider Daily for 30 days., Disp: 16 g, Rfl: 11      Objective     Vital Signs:  Temp:  [97.2 °F (36.2 °C)] 97.2 °F (36.2 °C)  Heart Rate:  [68] 68  BP: (160)/(85) 160/85 /85   Pulse 68   Temp 97.2 °F (36.2 °C) (Temporal)     Physical Exam:  Physical Exam  CONSTITUTIONAL: well nourished, well-developed, alert, oriented, in no acute distress   COMMUNICATION AND VOICE: able to communicate normally, normal voice quality  EARS:  Hearing: response to conversational voice normal   External Ears: auricles without lesions  Otoscopic: tympanic membrane appearance normal, no lesions, no perforation, normal mobility, no fluid, right cerumen impaction removed under microscopy with forceps  NOSE:  External Nose: structure normal, no tenderness on palpation, no nasal discharge, no lesions, no evidence of trauma, nostrils patent   ORAL:  Lips: upper and lower lips without lesion   NEUROLOGIC/PSYCHIATRIC: oriented to time, place and person, mood normal, affect appropriate, CN II-XII intact grossly        PROCEDURE NOTE    LOCATION: [facility]  PROVIDER: LUCIO George   PREOPERATIVE DIAGNOSIS: Cerumen Impaction, right  POSTOPERATIVE DIAGNOSIS: Same  PROCEDURE: Cerumen removal, right  ANESTHESIA: None   REASON FOR THE OPERATION: The patient verbally consented to intervention after a full discussion of risks, benefits, and alternatives. No guarantees were made or implied.   DETAILS OF THE OPERATION: The patient was reclined in the procedure room chair. The binocular microscope was used to visualize the ear canal and tympanic membrane. Cerumen was then removed from the right ear using a alligator forceps. Findings: Bilateral EACs clear without  lesion.  Bilateral tympanic membranes intact without effusion. Patient tolerated procedure well and was without complications        Results reviewed:        Assessment   Assessment:  1. Sensorineural hearing loss (SNHL) of both ears    2. Impacted cerumen of right ear        Plan   Plan:    Cerumen removed without difficulty.  See procedure note.  Use hearing protection in loud noise situations.  Continue hearing aid amplification.  Obtain a yearly audiogram to follow hearing.  She reports her last audiogram was performed by Dr. Harrison 1 year ago.  She sees him again in March.  She will bring copies of her previous audiograms to her next visit.  She was instructed to call or return should any problems arise prior to next office visit.    Time Spent: I spent 20 minutes caring for Rosina on this date of service. This time includes time spent by me in the following activities: preparing for the visit, reviewing tests, obtaining and/or reviewing a separately obtained history, performing a medically appropriate examination and/or evaluation and documenting information in the medical record.     No orders of the defined types were placed in this encounter.      Return in about 6 months (around 7/18/2023), or if symptoms worsen or fail to improve, for Recheck, Cerumen removal.    My findings and recommendations were discussed and questions were answered.     Margie Gloria, APRN

## 2023-05-04 ENCOUNTER — HOSPITAL ENCOUNTER (OUTPATIENT)
Dept: ULTRASOUND IMAGING | Facility: HOSPITAL | Age: 78
Discharge: HOME | End: 2023-05-04
Payer: MEDICARE

## 2023-05-04 ENCOUNTER — TRANSCRIBE ORDERS (OUTPATIENT)
Dept: ADMINISTRATIVE | Facility: HOSPITAL | Age: 78
End: 2023-05-04
Payer: MEDICARE

## 2023-05-04 DIAGNOSIS — M24.173 OTHER ARTICULAR CARTILAGE DISORDERS, UNSPECIFIED ANKLE: ICD-10-CM

## 2023-05-04 DIAGNOSIS — I82.501: ICD-10-CM

## 2023-05-04 DIAGNOSIS — I82.501: Primary | ICD-10-CM

## 2023-05-04 DIAGNOSIS — M24.173 OTHER ARTICULAR CARTILAGE DISORDERS, UNSPECIFIED ANKLE: Primary | ICD-10-CM

## 2023-05-04 PROCEDURE — 93971 EXTREMITY STUDY: CPT

## 2023-05-22 DIAGNOSIS — H90.3 SENSORINEURAL HEARING LOSS (SNHL) OF BOTH EARS: Primary | ICD-10-CM

## 2023-06-12 ENCOUNTER — TELEPHONE (OUTPATIENT)
Dept: OTOLARYNGOLOGY | Facility: CLINIC | Age: 78
End: 2023-06-12
Payer: MEDICARE

## 2023-06-12 NOTE — TELEPHONE ENCOUNTER
Provider: JOHNSON DEJESUS  Caller: VIVI JOSHI  Relationship to Patient: SELF  Reason for Call: PT SCHEDULE FOR 6MTH FU ON 7/18/23. PT HAS REQUESTED TO BE SEEN IN JUNE-HEARING HAS GOTTEN WORSE AND SHE HAS TO REPORT FOR JURY DUTY 7/3/23.    PLEASE DEANA PT TO DISCUSS HEARING LOSS, AND ADVISE IF APPT CAN BE MOVED UP.

## 2023-09-26 ENCOUNTER — TRANSCRIBE ORDERS (OUTPATIENT)
Dept: ADMINISTRATIVE | Facility: HOSPITAL | Age: 78
End: 2023-09-26
Payer: MEDICARE

## 2023-09-26 DIAGNOSIS — M79.662 PAIN OF LEFT LOWER LEG: Primary | ICD-10-CM

## 2023-09-26 DIAGNOSIS — Z12.31 ENCOUNTER FOR SCREENING MAMMOGRAM FOR MALIGNANT NEOPLASM OF BREAST: ICD-10-CM

## 2023-09-28 ENCOUNTER — HOSPITAL ENCOUNTER (OUTPATIENT)
Dept: ULTRASOUND IMAGING | Facility: HOSPITAL | Age: 78
Discharge: HOME | End: 2023-09-28
Payer: MEDICARE

## 2023-09-28 DIAGNOSIS — M79.662 PAIN OF LEFT LOWER LEG: ICD-10-CM

## 2023-09-28 PROCEDURE — 93971 EXTREMITY STUDY: CPT

## 2023-10-16 LAB
NCCN CRITERIA FLAG: NORMAL
TYRER CUZICK SCORE: 3.1

## 2023-10-26 ENCOUNTER — HOSPITAL ENCOUNTER (OUTPATIENT)
Dept: MAMMOGRAPHY | Facility: HOSPITAL | Age: 78
Discharge: HOME | End: 2023-10-26
Admitting: INTERNAL MEDICINE
Payer: MEDICARE

## 2023-10-26 DIAGNOSIS — Z12.31 ENCOUNTER FOR SCREENING MAMMOGRAM FOR MALIGNANT NEOPLASM OF BREAST: ICD-10-CM

## 2023-10-26 PROCEDURE — 77067 SCR MAMMO BI INCL CAD: CPT

## 2023-10-26 PROCEDURE — 77063 BREAST TOMOSYNTHESIS BI: CPT

## 2024-06-20 ENCOUNTER — OFFICE VISIT (OUTPATIENT)
Dept: OTOLARYNGOLOGY | Facility: CLINIC | Age: 79
End: 2024-06-20
Payer: MEDICARE

## 2024-06-20 VITALS
BODY MASS INDEX: 22.5 KG/M2 | HEART RATE: 80 BPM | SYSTOLIC BLOOD PRESSURE: 157 MMHG | HEIGHT: 66 IN | TEMPERATURE: 98.6 F | WEIGHT: 140 LBS | DIASTOLIC BLOOD PRESSURE: 85 MMHG

## 2024-06-20 DIAGNOSIS — H61.22 IMPACTED CERUMEN OF LEFT EAR: Primary | ICD-10-CM

## 2024-06-20 NOTE — PROGRESS NOTES
Ear Microscopy with Left Cerumen Removal    Date/Time: 6/20/2024 11:02 AM    Performed by: Martita Jacobo APRN  Authorized by: Martita Jacobo APRN    Ear examination was performed utilizing binocular microscopy.  Right auricle:   normal:   Right ear canal:   normal:   Left auricle:   normal:   Left ear canal:   impacted cerumen.     Procedure:    Cerumen was removed from the left ear with a suction.   Post-procedure details:     Inspection after the procedure revealed an intact TM.    The procedure was tolerated well, no immediate complications.

## 2024-09-27 ENCOUNTER — TRANSCRIBE ORDERS (OUTPATIENT)
Dept: ADMINISTRATIVE | Facility: HOSPITAL | Age: 79
End: 2024-09-27
Payer: MEDICARE

## 2024-09-27 DIAGNOSIS — Z12.31 ENCOUNTER FOR SCREENING MAMMOGRAM FOR MALIGNANT NEOPLASM OF BREAST: Primary | ICD-10-CM

## 2024-09-27 DIAGNOSIS — Z78.0 ASYMPTOMATIC MENOPAUSAL STATE: ICD-10-CM

## 2024-10-24 LAB
NCCN CRITERIA FLAG: NORMAL
TYRER CUZICK SCORE: 2.2

## 2024-10-28 ENCOUNTER — HOSPITAL ENCOUNTER (OUTPATIENT)
Dept: MAMMOGRAPHY | Facility: HOSPITAL | Age: 79
Discharge: HOME | End: 2024-10-28
Payer: MEDICARE

## 2024-10-28 ENCOUNTER — HOSPITAL ENCOUNTER (OUTPATIENT)
Dept: BONE DENSITY | Facility: HOSPITAL | Age: 79
Discharge: HOME | End: 2024-10-28
Payer: MEDICARE

## 2024-10-28 DIAGNOSIS — Z78.0 ASYMPTOMATIC MENOPAUSAL STATE: ICD-10-CM

## 2024-10-28 DIAGNOSIS — Z12.31 ENCOUNTER FOR SCREENING MAMMOGRAM FOR MALIGNANT NEOPLASM OF BREAST: ICD-10-CM

## 2024-10-28 PROCEDURE — 77067 SCR MAMMO BI INCL CAD: CPT

## 2024-10-28 PROCEDURE — 77063 BREAST TOMOSYNTHESIS BI: CPT

## 2024-10-28 PROCEDURE — 77080 DXA BONE DENSITY AXIAL: CPT

## 2024-11-06 ENCOUNTER — HOSPITAL ENCOUNTER (OUTPATIENT)
Dept: ULTRASOUND IMAGING | Facility: HOSPITAL | Age: 79
Discharge: HOME | End: 2024-11-06
Payer: MEDICARE

## 2024-11-06 ENCOUNTER — HOSPITAL ENCOUNTER (OUTPATIENT)
Dept: MAMMOGRAPHY | Facility: HOSPITAL | Age: 79
Discharge: HOME | End: 2024-11-06
Payer: MEDICARE

## 2024-11-06 DIAGNOSIS — R92.8 ABNORMAL MAMMOGRAM: ICD-10-CM

## 2024-11-06 PROCEDURE — 77065 DX MAMMO INCL CAD UNI: CPT

## 2024-11-06 PROCEDURE — G0279 TOMOSYNTHESIS, MAMMO: HCPCS

## 2025-01-31 ENCOUNTER — OFFICE VISIT (OUTPATIENT)
Dept: OTOLARYNGOLOGY | Facility: CLINIC | Age: 80
End: 2025-01-31
Payer: MEDICARE

## 2025-01-31 VITALS — WEIGHT: 130 LBS | BODY MASS INDEX: 20.89 KG/M2 | HEIGHT: 66 IN

## 2025-01-31 DIAGNOSIS — H61.22 IMPACTED CERUMEN OF LEFT EAR: Primary | ICD-10-CM

## 2025-01-31 NOTE — PROGRESS NOTES
Procedure   Ear Cerumen Removal    Date/Time: 1/31/2025 12:33 PM    Performed by: Azul Antonio APRN  Authorized by: Azul Antonio APRN  Consent: Verbal consent obtained.  Consent given by: patient  Patient identity confirmed: verbally with patient  Location details: left ear  Comments: Removal of cerumen impaction. Mild irritation of canal with mild bleeding. Patient educated to not use qtips. She does wear hearing aids. Right ear clear.   Procedure type: instrumentation, curette, suction   Sedation:  Patient sedated: no

## 2025-03-12 ENCOUNTER — OFFICE VISIT (OUTPATIENT)
Dept: OTOLARYNGOLOGY | Facility: CLINIC | Age: 80
End: 2025-03-12
Payer: MEDICARE

## 2025-03-12 VITALS
HEART RATE: 74 BPM | HEIGHT: 66 IN | BODY MASS INDEX: 20.89 KG/M2 | TEMPERATURE: 97.6 F | DIASTOLIC BLOOD PRESSURE: 77 MMHG | RESPIRATION RATE: 20 BRPM | SYSTOLIC BLOOD PRESSURE: 151 MMHG | WEIGHT: 130 LBS

## 2025-03-12 DIAGNOSIS — Z86.69 HISTORY OF IMPACTED CERUMEN: Primary | ICD-10-CM

## 2025-03-12 NOTE — PROGRESS NOTES
JAMAAL Boss  [facility]  [address], [address]  [place] [place] [address]  Fax [phone]  Phone [phone]      Visit Type: FOLLOW UP   Chief Complaint   Patient presents with    Follow-up     6 WEEK F/U WITH JAMAAL DICKEY           HISTORY OBTAINED FROM: patient  HPI  She presents for a follow up evaluation. She has had no current complaints. .     Past Medical History:   Diagnosis Date    Eczema     High cholesterol     Sensorineural hearing loss        Past Surgical History:   Procedure Laterality Date    EYE SURGERY  7/31/17 &  9/25/17    Cataract/lens replacement both eyes    HYSTERECTOMY      partial    TONSILLECTOMY         Family History: Her family history includes Cancer in her brother; Osteoarthritis in her maternal aunt, maternal grandmother, and mother; Stroke in her mother; Thyroid disease in her father.     Social History: She  reports that she quit smoking about 23 years ago. Her smoking use included cigarettes. She started smoking about 58 years ago. She has a 35 pack-year smoking history. She has never used smokeless tobacco. She reports current alcohol use. She reports that she does not use drugs.    Home Medications:  Biotin, Calcium Carbonate, Cholecalciferol, atorvastatin, fish oil, and multivitamin with minerals    Allergies:  She has no known allergies.       Vital Signs:   Temp:  [97.6 °F (36.4 °C)] 97.6 °F (36.4 °C)  Heart Rate:  [74] 74  Resp:  [20] 20  BP: (151)/(77) 151/77  ENT Physical Exam  Ear  Hearing: impaired to conversational voice;  Auricles: right auricle normal; left auricle normal;  External Mastoids: right external mastoid normal; left external mastoid normal;  Ear Canals: right ear canal normal; left ear canal normal;  Tympanic Membranes: right tympanic membrane normal; left tympanic membrane normal;                  Assessment & Plan  History of impacted cerumen                 Return in about 6 months (around 9/12/2025) for Follow up with JAMAAL Boss, for ear cleaning.        Electronically signed by JAMAAL Boss 03/12/25 11:13 AM CDT.